# Patient Record
Sex: FEMALE | Race: BLACK OR AFRICAN AMERICAN | URBAN - METROPOLITAN AREA
[De-identification: names, ages, dates, MRNs, and addresses within clinical notes are randomized per-mention and may not be internally consistent; named-entity substitution may affect disease eponyms.]

---

## 2019-10-11 ENCOUNTER — OFFICE VISIT (OUTPATIENT)
Dept: OBGYN CLINIC | Age: 49
End: 2019-10-11

## 2019-10-11 VITALS
SYSTOLIC BLOOD PRESSURE: 154 MMHG | DIASTOLIC BLOOD PRESSURE: 114 MMHG | HEART RATE: 113 BPM | BODY MASS INDEX: 36.08 KG/M2 | HEIGHT: 59 IN | WEIGHT: 179 LBS

## 2019-10-11 DIAGNOSIS — E66.01 SEVERE OBESITY (HCC): ICD-10-CM

## 2019-10-11 RX ORDER — ATORVASTATIN CALCIUM 20 MG/1
TABLET, FILM COATED ORAL DAILY
COMMUNITY

## 2019-10-11 RX ORDER — ERGOCALCIFEROL 1.25 MG/1
50000 CAPSULE ORAL DAILY
Refills: 2 | COMMUNITY
Start: 2019-07-22

## 2019-10-11 RX ORDER — SERTRALINE HYDROCHLORIDE 100 MG/1
100 TABLET, FILM COATED ORAL DAILY
Refills: 1 | COMMUNITY
Start: 2019-08-26

## 2019-10-11 NOTE — PROGRESS NOTES
20 minuntes spent with patient of which greater than 50 percent spent in counseling. Pap smear result is ASCUS HPV+- Will do repeat cotesting in July 2020.  F/U then for repeat cotesting

## 2020-07-14 ENCOUNTER — OFFICE VISIT (OUTPATIENT)
Dept: OBGYN CLINIC | Age: 50
End: 2020-07-14

## 2020-07-14 ENCOUNTER — HOSPITAL ENCOUNTER (OUTPATIENT)
Dept: LAB | Age: 50
Discharge: HOME | End: 2020-07-14
Payer: MEDICARE

## 2020-07-14 VITALS
RESPIRATION RATE: 18 BRPM | BODY MASS INDEX: 37.9 KG/M2 | HEIGHT: 59 IN | WEIGHT: 188 LBS | DIASTOLIC BLOOD PRESSURE: 86 MMHG | HEART RATE: 102 BPM | SYSTOLIC BLOOD PRESSURE: 136 MMHG

## 2020-07-14 DIAGNOSIS — R19.00 PELVIC MASS: ICD-10-CM

## 2020-07-14 DIAGNOSIS — Z12.39 BREAST CANCER SCREENING: Primary | ICD-10-CM

## 2020-07-14 DIAGNOSIS — Z01.419 WELL WOMAN EXAM: ICD-10-CM

## 2020-07-14 DIAGNOSIS — N87.9 CERVICAL DYSPLASIA: ICD-10-CM

## 2020-07-14 DIAGNOSIS — Z12.31 ENCOUNTER FOR SCREENING MAMMOGRAM FOR MALIGNANT NEOPLASM OF BREAST: ICD-10-CM

## 2020-07-14 PROCEDURE — 87624 HPV HI-RISK TYP POOLED RSLT: CPT

## 2020-07-14 PROCEDURE — 87625 HPV TYPES 16 & 18 ONLY: CPT

## 2020-07-14 PROCEDURE — 88175 CYTOPATH C/V AUTO FLUID REDO: CPT

## 2020-07-14 NOTE — PROGRESS NOTES
SUBJECTIVE:   52 y.o. female for annual routine Pap and checkup. Current Outpatient Medications   Medication Sig Dispense Refill    atorvastatin (LIPITOR) 20 mg tablet Take  by mouth daily.  sertraline (ZOLOFT) 100 mg tablet Take 100 mg by mouth daily. 1    VITAMIN D2 50,000 unit capsule Take 50,000 Units by mouth daily. 2     Allergies: Patient has no known allergies. Patient's last menstrual period was 07/03/2020 (exact date). ROS:  Feeling well. No dyspnea or chest pain on exertion. No abdominal pain, change in bowel habits, black or bloody stools. No urinary tract symptoms. GYN ROS: normal menses, no abnormal bleeding, pelvic pain or discharge, no breast pain or new or enlarging lumps on self exam. No neurological complaints. OBJECTIVE:   The patient appears well, alert, oriented x 3, in no distress. Visit Vitals  /86   Pulse (!) 102   Resp 18   Ht 4' 11\" (1.499 m)   Wt 188 lb (85.3 kg)   LMP 07/03/2020 (Exact Date)   BMI 37.97 kg/m²     Abdomen soft without tenderness, guarding, + mass felt up to level of umbilicus. No organomegaly. Extremities show no edema, normal peripheral pulses. Neurological is normal, no focal findings. BREAST EXAM: breasts appear normal, no suspicious masses, no skin or nipple changes or axillary nodes    PELVIC EXAM: normal external genitalia, vulva, vagina, cervix, had a pelvic mass felt on exam, tender to deep palpation. No adnexal abnormalities appreciated but adnexal exam difficult due to body habitus. Rectum WNL, Perineal body WNL, Uterus also difficult to assess due to body habitus; however, could be related to mass like process that was felt.     ASSESSMENT:   well woman  Pap smear- Had ASCUS HPV+  Pelvic mass  PLAN:   F/U for pelvic transvaginal ultrasound and mammogram  F/u 2 weeks for appointmnet

## 2020-07-28 ENCOUNTER — CLINICAL SUPPORT (OUTPATIENT)
Dept: OBGYN CLINIC | Age: 50
End: 2020-07-28

## 2020-07-28 ENCOUNTER — OFFICE VISIT (OUTPATIENT)
Dept: OBGYN CLINIC | Age: 50
End: 2020-07-28

## 2020-07-28 ENCOUNTER — HOSPITAL ENCOUNTER (OUTPATIENT)
Dept: LAB | Age: 50
Discharge: HOME | End: 2020-07-28
Payer: MEDICARE

## 2020-07-28 VITALS
WEIGHT: 188 LBS | RESPIRATION RATE: 18 BRPM | TEMPERATURE: 99.1 F | HEIGHT: 59 IN | BODY MASS INDEX: 37.9 KG/M2 | SYSTOLIC BLOOD PRESSURE: 134 MMHG | DIASTOLIC BLOOD PRESSURE: 84 MMHG | HEART RATE: 94 BPM

## 2020-07-28 DIAGNOSIS — N87.9 CERVICAL DYSPLASIA: Primary | ICD-10-CM

## 2020-07-28 DIAGNOSIS — R19.00 PELVIC MASS: ICD-10-CM

## 2020-07-28 DIAGNOSIS — N93.9 ABNORMAL UTERINE BLEEDING (AUB): ICD-10-CM

## 2020-07-28 DIAGNOSIS — N85.2 ENLARGED UTERUS: ICD-10-CM

## 2020-07-28 PROCEDURE — 88305 TISSUE EXAM BY PATHOLOGIST: CPT

## 2020-07-28 NOTE — PROGRESS NOTES
Pre Procedure Dx: fibroid uterus- distorted uterine lining  Post Procedure Dx: same  Procedure: Endometrial biopsy  Surgeon: Janell Arriaga  EBL: <2cc  Procedure: A time out was done, consents explaining risks, benefits, and alternatives to procedure verbalized including the risk of bleeding, infection, damage to surrounding organs/tissues, uterine perforation. Pt. Verbalized understanding. Consents were signed. A speculum was inserted into vaginal vault. The cervix was wiped off with three quintero swabs of betadyne. An endometrial biopsy Pipelle was inserted into the uterine cavity until gentle resistance from the patient's fundus was noted. The biopsy sample was aspirated into the Pipelle. Two complete suction aspiration passes were performed. Perfect hemostasis was noted at the end of the procedure. The patient tolerated the procedure well. I informed her that she will be notified with the results when they become available. Colposcopy      Pap Smear:      High grade squamous intraepithelial lesion                                                                                                                Previous abnormal paps: yes       Previous Cryotherapy: no     Previous hx of JAZ: no    Previous Leep: no    Personal history of cancer: no    Family history of cancer: no          Pregnancy test: negative/negative    Do you desire STD testing:no     HPV immunization series completed?:no        Past Medical History:   Diagnosis Date    Depression     Hypercholesterolemia     Stroke ([facility] [address]     2016       Present    Indications: High grade squamous intraepithelial lesion        Colonoscopic Examination:       [] Exam deferred    [x] Informed consent    Procedural Statement ( optional) :    [x] A vaginal speculum was inserted. The cervix and vaginal fornices were adequately visualized. Acetic acid was applied to the cervix and upper vagina.      Areas Inspected with the Coloscope    transformation zone, entire cervix adequately visualized  Vulva and Surrounding Areas:       [x] normal appearance    [x] normal hair distribution     [x] no lesions or masses      [] condylomata    [] vesicles    [] ulcers    [] abnormal pigmentation     [] erythema    [] abnormal hair pattern    [] varicosities    [] perineal scar    Vagina:       [] no abnormalities    [] atrophic change      [x] aceto white lesion -3 oclock, 6 oclock, 9 oclock  [] aceto white lesion with spicules    [] multiple aceto white lesions with present    [] multiple aceto white lesions with spicule    [] signs of candidal vaginitis     [] signs of bacterial vaginosis    [] signs of trichomonal vaginitis       Biopsy:   3 biopsies collected  Cervix:   ECC performed  :        Cervical Findings:      [x] ECC done    Which lesion?            Assessment:     Colposcopic Assessment:   JAZ 1        Plan    Instructions given     [x] pelvic rest   [x] patient told to expect some bleeding and/or discharge   [x] to discuss results by phone   [] to return to discuss results   [] cryotherapy   [] loop excision discussed   [] laser treatment of the cervix discussed   [] conization of the cervix discussed   [] laser vaporization of the condylomata discussed    Other plans or treatments

## 2020-07-30 ENCOUNTER — DOCUMENTATION ONLY (OUTPATIENT)
Dept: OBGYN CLINIC | Age: 50
End: 2020-07-30

## 2020-07-30 NOTE — PROGRESS NOTES
Had an appointment scheduled for patient on 7/28 to see Dr. Olman Ayala for High grade result of pap smear. Informed of this apppointment on 7/14. Patient and  came into my office that same day for a pelvic ultrasound and an appointment with me to discuss results. Patient's  stated that they did not want to drive all the way to [address] [place] [address] and for me to see if [place] could [place] I had a long and thorough discussion with the patient and her  of the importance of this appointment and that if they would consider going just to see what Dr. Joaquin Jacques would have to say. The patient and her  decided they preferred to cancel this appointment and to go see someone [place] the beach. I made them an appointment with SHAUN at [facility] on 8/3. We called patient to inform of the appointment time and date as well as her results of her colposcopy. There was no answer. We left a voicemail.

## 2020-07-30 NOTE — PROGRESS NOTES
Called patient to inform of her appointment on 8/3 as well as about colposcopy results- Patient's  stated that they had to cancel this appointment and postponed it again. I advised tat they need to be seen ASAP for these precancerous cells to determine next steps as far as surgery to further evaluate precancer state and to rule out progression to cancer.

## 2023-11-02 NOTE — ADDENDUM NOTE
Addended by: Valentin Sport on: 7/14/2020 12:37 PM     Modules accepted: Orders Show Aperture Variable?: Yes Consent: The patient's consent was obtained including but not limited to risks of crusting, scabbing, blistering, scarring, darker or lighter pigmentary change, recurrence, incomplete removal and infection. Duration Of Freeze Thaw-Cycle (Seconds): 0 Render Note In Bullet Format When Appropriate: No Detail Level: Detailed Post-Care Instructions: I reviewed with the patient in detail post-care instructions. Patient is to wear sunprotection, and avoid picking at any of the treated lesions. Pt may apply Vaseline to crusted or scabbing areas.